# Patient Record
Sex: MALE | Race: WHITE | NOT HISPANIC OR LATINO | Employment: FULL TIME | URBAN - NONMETROPOLITAN AREA
[De-identification: names, ages, dates, MRNs, and addresses within clinical notes are randomized per-mention and may not be internally consistent; named-entity substitution may affect disease eponyms.]

---
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
## 2021-01-08 ENCOUNTER — OFFICE VISIT (OUTPATIENT)
Dept: FAMILY MEDICINE CLINIC | Facility: CLINIC | Age: 21
End: 2021-01-08

## 2021-01-08 VITALS
DIASTOLIC BLOOD PRESSURE: 84 MMHG | SYSTOLIC BLOOD PRESSURE: 144 MMHG | HEIGHT: 74 IN | BODY MASS INDEX: 40.43 KG/M2 | OXYGEN SATURATION: 98 % | TEMPERATURE: 97.5 F | HEART RATE: 91 BPM | WEIGHT: 315 LBS

## 2021-01-08 DIAGNOSIS — F41.1 GENERALIZED ANXIETY DISORDER: ICD-10-CM

## 2021-01-08 DIAGNOSIS — I10 ESSENTIAL HYPERTENSION: Primary | ICD-10-CM

## 2021-01-08 DIAGNOSIS — J30.89 NON-SEASONAL ALLERGIC RHINITIS DUE TO OTHER ALLERGIC TRIGGER: ICD-10-CM

## 2021-01-08 DIAGNOSIS — F33.1 MAJOR DEPRESSIVE DISORDER, RECURRENT EPISODE, MODERATE (HCC): ICD-10-CM

## 2021-01-08 DIAGNOSIS — K21.9 GASTROESOPHAGEAL REFLUX DISEASE: ICD-10-CM

## 2021-01-08 DIAGNOSIS — E66.01 MORBID OBESITY DUE TO EXCESS CALORIES (HCC): ICD-10-CM

## 2021-01-08 PROCEDURE — 99214 OFFICE O/P EST MOD 30 MIN: CPT | Performed by: NURSE PRACTITIONER

## 2021-01-08 RX ORDER — OMEPRAZOLE 40 MG/1
40 CAPSULE, DELAYED RELEASE ORAL DAILY
Qty: 30 CAPSULE | Refills: 5 | Status: SHIPPED | OUTPATIENT
Start: 2021-01-08 | End: 2021-07-14 | Stop reason: SDUPTHER

## 2021-01-08 RX ORDER — SERTRALINE HYDROCHLORIDE 25 MG/1
25 TABLET, FILM COATED ORAL DAILY
Qty: 30 TABLET | Refills: 5 | Status: SHIPPED | OUTPATIENT
Start: 2021-01-08 | End: 2021-07-14 | Stop reason: SDUPTHER

## 2021-01-08 RX ORDER — HYDROCHLOROTHIAZIDE 25 MG/1
25 TABLET ORAL DAILY
Qty: 30 TABLET | Refills: 5 | Status: SHIPPED | OUTPATIENT
Start: 2021-01-08 | End: 2021-07-14 | Stop reason: SDUPTHER

## 2021-01-08 RX ORDER — FEXOFENADINE HCL 180 MG/1
180 TABLET ORAL DAILY
Qty: 30 TABLET | Refills: 5 | Status: SHIPPED | OUTPATIENT
Start: 2021-01-08 | End: 2021-07-14 | Stop reason: SDUPTHER

## 2021-01-08 NOTE — PROGRESS NOTES
"Chief Complaint  Hypertension    Subjective          Fredis Zavaleta presents to [facility] for   Hypertension  This is a chronic problem. The current episode started more than 1 year ago. The problem is unchanged. The problem is uncontrolled. Associated symptoms include anxiety. Pertinent negatives include no chest pain, palpitations or shortness of breath. There are no associated agents to hypertension. Risk factors for coronary artery disease include sedentary lifestyle, obesity and male gender. Current antihypertension treatment includes diuretics. The current treatment provides moderate improvement. Compliance problems include exercise and diet (pt states he has not taken his medication today).    Anxiety  Presents for follow-up visit. Symptoms include depressed mood and nervous/anxious behavior. Patient reports no chest pain, palpitations or shortness of breath. Symptoms occur most days. The severity of symptoms is causing significant distress. The quality of sleep is good. Nighttime awakenings: occasional.     Compliance with medications is %. Treatment side effects: none.   Heartburn  He complains of heartburn. He reports no chest pain. This is a chronic problem. The current episode started more than 1 year ago. The problem occurs frequently. The problem has been waxing and waning. The heartburn does not wake him from sleep. The heartburn does not limit his activity. The heartburn doesn't change with position. The symptoms are aggravated by certain foods. Risk factors include obesity and lack of exercise. He has tried a PPI for the symptoms. The treatment provided significant relief.       Objective   Vital Signs:   /84   Pulse 91   Temp 97.5 °F (36.4 °C)   Ht 188 cm (74.02\")   Wt (!) 176 kg (387 lb)   SpO2 98%   BMI 49.67 kg/m²     Physical Exam  Vitals signs and nursing note reviewed.   Constitutional:       Appearance: He is well-developed. He is obese. "   Neck:      Musculoskeletal: Normal range of motion and neck supple.   Cardiovascular:      Rate and Rhythm: Normal rate and regular rhythm.      Heart sounds: Normal heart sounds.   Pulmonary:      Effort: Pulmonary effort is normal.      Breath sounds: Normal breath sounds.   Skin:     General: Skin is warm and dry.   Neurological:      Mental Status: He is alert and oriented to person, place, and time.   Psychiatric:         Behavior: Behavior normal.         Thought Content: Thought content normal.         Judgment: Judgment normal.        Result Review :                 Assessment and Plan    Problem List Items Addressed This Visit        Allergies and Adverse Reactions    Allergic rhinitis    Relevant Medications    fexofenadine (ALLEGRA) 180 MG tablet       Cardiac and Vasculature    Essential hypertension - Primary    Relevant Medications    hydroCHLOROthiazide (HYDRODIURIL) 25 MG tablet       Endocrine and Metabolic    Morbid obesity due to excess calories (CMS/HCC)      Other Visit Diagnoses     Generalized anxiety disorder        Relevant Medications    sertraline (ZOLOFT) 25 MG tablet    Major depressive disorder, recurrent episode, moderate (CMS/HCC)        Relevant Medications    sertraline (ZOLOFT) 25 MG tablet    Gastroesophageal reflux disease        Relevant Medications    omeprazole (PrilOSEC) 40 MG capsule          Follow Up   Return in about 6 months (around 7/8/2021).  Patient was given instructions and counseling regarding his condition or for health maintenance advice. Please see specific information pulled into the AVS if appropriate.

## 2021-07-14 DIAGNOSIS — J30.89 NON-SEASONAL ALLERGIC RHINITIS DUE TO OTHER ALLERGIC TRIGGER: ICD-10-CM

## 2021-07-14 DIAGNOSIS — F33.1 MAJOR DEPRESSIVE DISORDER, RECURRENT EPISODE, MODERATE (HCC): ICD-10-CM

## 2021-07-14 DIAGNOSIS — F41.1 GENERALIZED ANXIETY DISORDER: ICD-10-CM

## 2021-07-14 DIAGNOSIS — K21.9 GASTROESOPHAGEAL REFLUX DISEASE: ICD-10-CM

## 2021-07-14 DIAGNOSIS — I10 ESSENTIAL HYPERTENSION: ICD-10-CM

## 2021-07-14 NOTE — TELEPHONE ENCOUNTER
Caller: Fredis Zavaleta    Relationship: Self    Best call back number:[phone]    Medication needed:   Requested Prescriptions     Pending Prescriptions Disp Refills   • omeprazole (priLOSEC) 40 MG capsule 30 capsule 5     Sig: Take 1 capsule by mouth Daily.   • sertraline (ZOLOFT) 25 MG tablet 30 tablet 5     Sig: Take 1 tablet by mouth Daily.   • hydroCHLOROthiazide (HYDRODIURIL) 25 MG tablet 30 tablet 5     Sig: Take 1 tablet by mouth Daily.   • fexofenadine (ALLEGRA) 180 MG tablet 30 tablet 5     Sig: Take 1 tablet by mouth Daily.       When do you need the refill by: ASAP    What additional details did the patient provide when requesting the medication: REQUESTING REFILL UNTIL APPT ON 8/3/21    Does the patient have less than a 3 day supply:  [x] Yes  [] No    What is the patient's preferred pharmacy: [place], [place] - [address] [facility] [phone] [facility] [phone]

## 2021-07-16 DIAGNOSIS — K21.9 GASTROESOPHAGEAL REFLUX DISEASE: ICD-10-CM

## 2021-07-16 DIAGNOSIS — J30.89 NON-SEASONAL ALLERGIC RHINITIS DUE TO OTHER ALLERGIC TRIGGER: ICD-10-CM

## 2021-07-16 DIAGNOSIS — F41.1 GENERALIZED ANXIETY DISORDER: ICD-10-CM

## 2021-07-16 DIAGNOSIS — F33.1 MAJOR DEPRESSIVE DISORDER, RECURRENT EPISODE, MODERATE (HCC): ICD-10-CM

## 2021-07-16 DIAGNOSIS — I10 ESSENTIAL HYPERTENSION: ICD-10-CM

## 2021-07-18 RX ORDER — SERTRALINE HYDROCHLORIDE 25 MG/1
25 TABLET, FILM COATED ORAL DAILY
Qty: 30 TABLET | Refills: 5 | Status: SHIPPED | OUTPATIENT
Start: 2021-07-18 | End: 2021-08-03 | Stop reason: SDUPTHER

## 2021-07-18 RX ORDER — HYDROCHLOROTHIAZIDE 25 MG/1
25 TABLET ORAL DAILY
Qty: 30 TABLET | Refills: 5 | Status: SHIPPED | OUTPATIENT
Start: 2021-07-18 | End: 2021-08-03 | Stop reason: SDUPTHER

## 2021-07-18 RX ORDER — FEXOFENADINE HCL 180 MG/1
180 TABLET ORAL DAILY
Qty: 30 TABLET | Refills: 5 | Status: SHIPPED | OUTPATIENT
Start: 2021-07-18 | End: 2021-08-03 | Stop reason: SDUPTHER

## 2021-07-18 RX ORDER — OMEPRAZOLE 40 MG/1
40 CAPSULE, DELAYED RELEASE ORAL DAILY
Qty: 30 CAPSULE | Refills: 5 | Status: SHIPPED | OUTPATIENT
Start: 2021-07-18 | End: 2021-08-03 | Stop reason: SDUPTHER

## 2021-07-26 RX ORDER — SERTRALINE HYDROCHLORIDE 25 MG/1
25 TABLET, FILM COATED ORAL DAILY
Qty: 30 TABLET | Refills: 5 | OUTPATIENT
Start: 2021-07-26

## 2021-07-26 RX ORDER — HYDROCHLOROTHIAZIDE 25 MG/1
25 TABLET ORAL DAILY
Qty: 30 TABLET | Refills: 5 | OUTPATIENT
Start: 2021-07-26

## 2021-07-26 RX ORDER — OMEPRAZOLE 40 MG/1
40 CAPSULE, DELAYED RELEASE ORAL DAILY
Qty: 30 CAPSULE | Refills: 5 | OUTPATIENT
Start: 2021-07-26

## 2021-07-26 RX ORDER — FEXOFENADINE HCL 180 MG/1
180 TABLET ORAL DAILY
Qty: 30 TABLET | Refills: 5 | OUTPATIENT
Start: 2021-07-26

## 2021-08-03 ENCOUNTER — OFFICE VISIT (OUTPATIENT)
Dept: FAMILY MEDICINE CLINIC | Facility: CLINIC | Age: 21
End: 2021-08-03

## 2021-08-03 VITALS
DIASTOLIC BLOOD PRESSURE: 76 MMHG | BODY MASS INDEX: 39.17 KG/M2 | TEMPERATURE: 97.1 F | HEART RATE: 87 BPM | WEIGHT: 315 LBS | OXYGEN SATURATION: 98 % | SYSTOLIC BLOOD PRESSURE: 138 MMHG | HEIGHT: 75 IN

## 2021-08-03 DIAGNOSIS — K21.9 GASTROESOPHAGEAL REFLUX DISEASE: ICD-10-CM

## 2021-08-03 DIAGNOSIS — Z00.00 ANNUAL PHYSICAL EXAM: Primary | ICD-10-CM

## 2021-08-03 DIAGNOSIS — F41.1 GENERALIZED ANXIETY DISORDER: ICD-10-CM

## 2021-08-03 DIAGNOSIS — J30.89 NON-SEASONAL ALLERGIC RHINITIS DUE TO OTHER ALLERGIC TRIGGER: ICD-10-CM

## 2021-08-03 DIAGNOSIS — F33.1 MAJOR DEPRESSIVE DISORDER, RECURRENT EPISODE, MODERATE (HCC): ICD-10-CM

## 2021-08-03 DIAGNOSIS — I10 ESSENTIAL HYPERTENSION: ICD-10-CM

## 2021-08-03 PROCEDURE — 99395 PREV VISIT EST AGE 18-39: CPT | Performed by: FAMILY MEDICINE

## 2021-08-03 RX ORDER — HYDROCHLOROTHIAZIDE 25 MG/1
25 TABLET ORAL DAILY
Qty: 90 TABLET | Refills: 1 | Status: SHIPPED | OUTPATIENT
Start: 2021-08-03 | End: 2022-07-26 | Stop reason: SDUPTHER

## 2021-08-03 RX ORDER — FEXOFENADINE HCL 180 MG/1
180 TABLET ORAL DAILY
Qty: 90 TABLET | Refills: 1 | Status: SHIPPED | OUTPATIENT
Start: 2021-08-03 | End: 2022-07-26 | Stop reason: SDUPTHER

## 2021-08-03 RX ORDER — SERTRALINE HYDROCHLORIDE 25 MG/1
25 TABLET, FILM COATED ORAL DAILY
Qty: 90 TABLET | Refills: 1 | Status: SHIPPED | OUTPATIENT
Start: 2021-08-03 | End: 2022-07-26 | Stop reason: SDUPTHER

## 2021-08-03 RX ORDER — OMEPRAZOLE 40 MG/1
40 CAPSULE, DELAYED RELEASE ORAL DAILY
Qty: 30 CAPSULE | Refills: 5 | Status: SHIPPED | OUTPATIENT
Start: 2021-08-03 | End: 2022-07-26 | Stop reason: SDUPTHER

## 2021-08-03 NOTE — PROGRESS NOTES
Annual Exam      HEALTH RISK ASSESSMENT    2000    Recent Hospitalizations:  No hospitalization(s) within the last year..        Current Medical Providers:  Patient Care Team:  Robyn Burger APRN as PCP - General (Nurse Practitioner)        Smoking Status:  Social History     Tobacco Use   Smoking Status Never Smoker   Smokeless Tobacco Never Used       Alcohol Consumption:  Social History     Substance and Sexual Activity   Alcohol Use No       Depression Screen:   PHQ-2/PHQ-9 Depression Screening 8/3/2021   Little interest or pleasure in doing things 0   Feeling down, depressed, or hopeless 0   Trouble falling or staying asleep, or sleeping too much 0   Feeling tired or having little energy 0   Poor appetite or overeating 0   Feeling bad about yourself - or that you are a failure or have let yourself or your family down 0   Trouble concentrating on things, such as reading the newspaper or watching television 0   Moving or speaking so slowly that other people could have noticed. Or the opposite - being so fidgety or restless that you have been moving around a lot more than usual 0   Thoughts that you would be better off dead, or of hurting yourself in some way 0   Total Score 0   If you checked off any problems, how difficult have these problems made it for you to do your work, take care of things at home, or get along with other people? Not difficult at all       Health Habits and Functional and Cognitive Screening:  No flowsheet data found.        Does the patient have evidence of cognitive impairment? No    Aspirin use counseling: Does not need ASA (and currently is not on it)      Recent Lab Results:  CMP:  Lab Results   Component Value Date    GLU 93 05/01/2019    BUN 13 05/01/2019    CREATININE 0.79 05/01/2019    EGFRIFNONA CANCELED 05/01/2019    EGFRIFAFRI CANCELED 05/01/2019    BCR 16.5 05/01/2019     05/01/2019    K 4.0 05/01/2019    CO2 27.2 05/01/2019    CALCIUM 9.6 05/01/2019     PROTENTOTREF 7.3 05/01/2019    ALBUMIN 4.70 (H) 05/01/2019    LABGLOBREF 2.6 05/01/2019    LABIL2 1.8 05/01/2019    BILITOT 0.2 05/01/2019    ALKPHOS 70 05/01/2019    AST 18 05/01/2019    ALT 36 05/01/2019     Lipid Panel:  Lab Results   Component Value Date    TRIG 114 03/30/2018    HDL 44 (L) 03/30/2018    VLDL 22.8 03/30/2018     HbA1c:  Lab Results   Component Value Date    HGBA1C 4.7 10/14/2015       Visual Acuity:   Visual Acuity Screening    Right eye Left eye Both eyes   Without correction:      With correction: 20/30 20/200 20/30       Age-appropriate Screening Schedule:  Refer to the list below for future screening recommendations based on patient's age, sex and/or medical conditions. Orders for these recommended tests are listed in the plan section. The patient has been provided with a written plan.    Health Maintenance   Topic Date Due   • INFLUENZA VACCINE  10/01/2021   • TDAP/TD VACCINES (2 - Td or Tdap) 08/28/2023        Subjective   History of Present Illness    Fredis Zavaleta is a 20 y.o. male who presents for an annual physical exam.    The following portions of the patient's history were reviewed and updated as appropriate: allergies, current medications, past family history, past medical history, past social history, past surgical history and problem list.    Outpatient Medications Prior to Visit   Medication Sig Dispense Refill   • levalbuterol (XOPENEX) 0.63 MG/3ML nebulizer solution Take 1 ampule by nebulization Every 6 (Six) Hours As Needed for Wheezing. 360 mL 0   • fexofenadine (ALLEGRA) 180 MG tablet Take 1 tablet by mouth Daily. 30 tablet 5   • hydroCHLOROthiazide (HYDRODIURIL) 25 MG tablet Take 1 tablet by mouth Daily. 30 tablet 5   • omeprazole (priLOSEC) 40 MG capsule Take 1 capsule by mouth Daily. 30 capsule 5   • sertraline (ZOLOFT) 25 MG tablet Take 1 tablet by mouth Daily. 30 tablet 5     No facility-administered medications prior to visit.       Patient Active Problem List    Diagnosis   • Allergic rhinitis   • Airway hyperreactivity   • Cerumen impaction   • Essential hypertension   • Morbid obesity due to excess calories (CMS/[facility])   • Hypothyroid   • GERD (gastroesophageal reflux disease)   • Elevated heart rate with elevated blood pressure   • Eczema of face       Advance Care Planning:  ACP discussion was declined by the patient. Patient does not have an advance directive, declines further assistance.    Identification of Risk Factors:  Risk factors include: n/a.    Review of Systems   All other systems reviewed and are negative.      Compared to one year ago, the patient feels his physical health is better.  Compared to one year ago, the patient feels his mental health is better.    Objective     Physical Exam  Constitutional:       General: He is not in acute distress.     Appearance: Normal appearance. He is well-developed and well-groomed. He is obese. He is not ill-appearing, toxic-appearing or diaphoretic.   HENT:      Head: Normocephalic.      Right Ear: Tympanic membrane, ear canal and external ear normal.      Left Ear: Tympanic membrane, ear canal and external ear normal.      Nose: Nose normal. No congestion or rhinorrhea.      Mouth/Throat:      Mouth: Mucous membranes are moist.      Pharynx: Oropharynx is clear. No oropharyngeal exudate or posterior oropharyngeal erythema.   Eyes:      General: Lids are normal.         Right eye: No discharge.         Left eye: No discharge.      Extraocular Movements: Extraocular movements intact.      Pupils: Pupils are equal, round, and reactive to light.   Neck:      Vascular: No carotid bruit.   Cardiovascular:      Rate and Rhythm: Normal rate and regular rhythm.      Pulses: Normal pulses.      Heart sounds: Normal heart sounds. No murmur heard.   No friction rub. No gallop.    Pulmonary:      Effort: Pulmonary effort is normal. No respiratory distress.      Breath sounds: Normal breath sounds. No stridor. No wheezing, rhonchi  "or rales.   Chest:      Chest wall: No tenderness.   Abdominal:      General: Bowel sounds are normal. There is no distension.      Palpations: Abdomen is soft. There is no mass.      Tenderness: There is no abdominal tenderness. There is no right CVA tenderness, left CVA tenderness, guarding or rebound.      Hernia: No hernia is present.   Musculoskeletal:         General: No swelling or tenderness. Normal range of motion.      Cervical back: Normal range of motion and neck supple. No rigidity or tenderness.      Right lower leg: No edema.      Left lower leg: No edema.   Lymphadenopathy:      Cervical: No cervical adenopathy.   Skin:     General: Skin is warm.      Capillary Refill: Capillary refill takes less than 2 seconds.      Coloration: Skin is not jaundiced.      Findings: No bruising, erythema or rash.   Neurological:      General: No focal deficit present.      Mental Status: He is alert and oriented to person, place, and time.      Motor: Motor function is intact. No weakness.      Coordination: Coordination is intact.      Gait: Gait is intact. Gait normal.   Psychiatric:         Attention and Perception: Attention normal.         Mood and Affect: Mood normal.         Speech: Speech normal.         Behavior: Behavior normal.         Cognition and Memory: Cognition normal.         Judgment: Judgment normal.         Vitals:    08/03/21 1017   BP: 138/76   BP Location: Right arm   Patient Position: Sitting   Cuff Size: Large Adult   Pulse: 87   Temp: 97.1 °F (36.2 °C)   SpO2: 98%   Weight: (!) 166 kg (367 lb)   Height: 190.5 cm (75\")   PainSc: 0-No pain       Patient's Body mass index is 45.87 kg/m². indicating that he is morbidly obese (BMI > 40 or > 35 with obesity - related health condition). Obesity-related health conditions include the following: hypertension. Obesity is improving with lifestyle modifications. BMI is is above average; BMI management plan is completed. We discussed portion control and " increasing exercise..      Assessment/Plan   Patient Self-Management and Personalized Health Advice  The patient has been provided with information about: diet, exercise and weight management and preventive services including:   · Annual Wellness Visit (AWV).    Visit Diagnoses:    ICD-10-CM ICD-9-CM   1. Annual physical exam  Z00.00 V70.0   2. Essential hypertension  I10 401.9   3. Gastroesophageal reflux disease  K21.9 530.81   4. Generalized anxiety disorder  F41.1 300.02   5. Major depressive disorder, recurrent episode, moderate (CMS/HCC)  F33.1 296.32   6. Non-seasonal allergic rhinitis due to other allergic trigger  J30.89 477.8       No orders of the defined types were placed in this encounter.      Outpatient Encounter Medications as of 8/3/2021   Medication Sig Dispense Refill   • fexofenadine (ALLEGRA) 180 MG tablet Take 1 tablet by mouth Daily. 90 tablet 1   • hydroCHLOROthiazide (HYDRODIURIL) 25 MG tablet Take 1 tablet by mouth Daily. 90 tablet 1   • levalbuterol (XOPENEX) 0.63 MG/3ML nebulizer solution Take 1 ampule by nebulization Every 6 (Six) Hours As Needed for Wheezing. 360 mL 0   • omeprazole (priLOSEC) 40 MG capsule Take 1 capsule by mouth Daily. 30 capsule 5   • sertraline (ZOLOFT) 25 MG tablet Take 1 tablet by mouth Daily. 90 tablet 1   • [DISCONTINUED] fexofenadine (ALLEGRA) 180 MG tablet Take 1 tablet by mouth Daily. 30 tablet 5   • [DISCONTINUED] hydroCHLOROthiazide (HYDRODIURIL) 25 MG tablet Take 1 tablet by mouth Daily. 30 tablet 5   • [DISCONTINUED] omeprazole (priLOSEC) 40 MG capsule Take 1 capsule by mouth Daily. 30 capsule 5   • [DISCONTINUED] sertraline (ZOLOFT) 25 MG tablet Take 1 tablet by mouth Daily. 30 tablet 5     No facility-administered encounter medications on file as of 8/3/2021.       Reviewed use of high risk medication in the elderly: not applicable  Reviewed for potential of harmful drug interactions in the elderly: not applicable    Follow Up:  Return in about 6 months  (around 2/3/2022), or with lab work, for Recheck.     An After Visit Summary and PPPS with all of these plans were given to the patient.

## 2021-08-07 ENCOUNTER — HOSPITAL ENCOUNTER (EMERGENCY)
Facility: HOSPITAL | Age: 21
Discharge: HOME | End: 2021-08-07
Attending: FAMILY MEDICINE | Admitting: FAMILY MEDICINE

## 2021-08-07 ENCOUNTER — APPOINTMENT (OUTPATIENT)
Dept: GENERAL RADIOLOGY | Facility: HOSPITAL | Age: 21
End: 2021-08-07

## 2021-08-07 VITALS
OXYGEN SATURATION: 96 % | SYSTOLIC BLOOD PRESSURE: 150 MMHG | BODY MASS INDEX: 38.36 KG/M2 | WEIGHT: 315 LBS | HEART RATE: 76 BPM | TEMPERATURE: 98.7 F | HEIGHT: 76 IN | RESPIRATION RATE: 16 BRPM | DIASTOLIC BLOOD PRESSURE: 84 MMHG

## 2021-08-07 DIAGNOSIS — S69.92XA FINGER INJURY, LEFT, INITIAL ENCOUNTER: Primary | ICD-10-CM

## 2021-08-07 PROCEDURE — 73140 X-RAY EXAM OF FINGER(S): CPT | Performed by: RADIOLOGY

## 2021-08-07 PROCEDURE — 99282 EMERGENCY DEPT VISIT SF MDM: CPT

## 2021-08-07 PROCEDURE — 73140 X-RAY EXAM OF FINGER(S): CPT

## 2021-08-07 RX ORDER — NAPROXEN 500 MG/1
500 TABLET ORAL 2 TIMES DAILY PRN
Qty: 12 TABLET | Refills: 0 | Status: SHIPPED | OUTPATIENT
Start: 2021-08-07

## 2021-08-07 NOTE — ED PROVIDER NOTES
Subjective   Patient works at [employer] and got his left pinky caught between 2 baggies.  Patient is having increased pain to his pinky and numbness.      History provided by:  Patient   used: No    Finger Injury  Location:  Left pinky pain   Severity:  Mild  Onset quality:  Sudden  Duration:  1 hour  Timing:  Constant  Progression:  Worsening  Chronicity:  New  Relieved by:  Nothing   Worsened by:  Ambulation   Ineffective treatments:  None      Review of Systems   Constitutional: Negative.    HENT: Negative.    Eyes: Negative.    Respiratory: Negative.    Cardiovascular: Negative.    Gastrointestinal: Negative.    Endocrine: Negative.    Genitourinary: Negative.    Musculoskeletal: Positive for arthralgias and joint swelling.   Skin: Negative.    Allergic/Immunologic: Negative.    Neurological: Negative.    Hematological: Negative.    Psychiatric/Behavioral: Negative.        Past Medical History:   Diagnosis Date   • Anxiety    • Asthma    • Cerumen impaction    • Depression    • Elevated heart rate with elevated blood pressure    • GERD (gastroesophageal reflux disease)    • Hypertension    • Hypothyroid    • Multiple allergies    • Obesity        Allergies   Allergen Reactions   • Sulfa Antibiotics Rash       Past Surgical History:   Procedure Laterality Date   • EYE SURGERY     • TONSILLECTOMY     • TYMPANOSTOMY TUBE PLACEMENT         Family History   Problem Relation Age of Onset   • Diabetes Paternal Grandmother    • Heart disease Paternal Grandmother    • Hypertension Paternal Grandmother    • Diabetes Paternal Grandfather    • Heart disease Paternal Grandfather    • Hypertension Paternal Grandfather    • Cancer Mother    • Hemochromatosis Father    • No Known Problems Sister    • No Known Problems Maternal Grandmother    • No Known Problems Maternal Grandfather        Social History     Socioeconomic History   • Marital status: Single     Spouse name: Not on file   • Number of children:  Not on file   • Years of education: Not on file   • Highest education level: Not on file   Tobacco Use   • Smoking status: Never Smoker   • Smokeless tobacco: Never Used   Substance and Sexual Activity   • Alcohol use: No   • Drug use: No   • Sexual activity: Defer           Objective   Physical Exam  Vitals and nursing note reviewed.   Constitutional:       Appearance: He is well-developed.   HENT:      Head: Normocephalic.   Cardiovascular:      Rate and Rhythm: Normal rate and regular rhythm.   Pulmonary:      Effort: Pulmonary effort is normal.      Breath sounds: Normal breath sounds.   Abdominal:      General: Bowel sounds are normal.      Palpations: Abdomen is soft.      Tenderness: There is no abdominal tenderness.   Musculoskeletal:      Left hand: Swelling and tenderness present. Decreased range of motion.      Cervical back: Neck supple.   Skin:     General: Skin is warm and dry.   Neurological:      Mental Status: He is alert and oriented to person, place, and time.   Psychiatric:         Behavior: Behavior normal.         Thought Content: Thought content normal.         Judgment: Judgment normal.         Procedures           ED Course                                           MDM    Final diagnoses:   Finger injury, left, initial encounter       ED Disposition  ED Disposition     ED Disposition Condition Comment    Discharge Stable           Robyn Burger, APRN  [address] [place] [address]  [phone]    In 2 days           Medication List      New Prescriptions    naproxen 500 MG EC tablet  Commonly known as: EC NAPROSYN  Take 1 tablet by mouth 2 (Two) Times a Day As Needed for Mild Pain .           Where to Get Your Medications      These medications were sent to [facility] [place] - [address] - [phone]  - [phone]  [address], [place] [address]    Phone: [phone]   · naproxen 500 MG EC tablet          Kathi Rowley PA  08/07/21 3951

## 2022-07-26 ENCOUNTER — OFFICE VISIT (OUTPATIENT)
Dept: FAMILY MEDICINE CLINIC | Facility: CLINIC | Age: 22
End: 2022-07-26

## 2022-07-26 VITALS
WEIGHT: 315 LBS | OXYGEN SATURATION: 100 % | DIASTOLIC BLOOD PRESSURE: 80 MMHG | HEIGHT: 76 IN | TEMPERATURE: 96.4 F | BODY MASS INDEX: 38.36 KG/M2 | HEART RATE: 76 BPM | SYSTOLIC BLOOD PRESSURE: 128 MMHG

## 2022-07-26 DIAGNOSIS — K21.9 GASTROESOPHAGEAL REFLUX DISEASE: ICD-10-CM

## 2022-07-26 DIAGNOSIS — J30.89 NON-SEASONAL ALLERGIC RHINITIS DUE TO OTHER ALLERGIC TRIGGER: ICD-10-CM

## 2022-07-26 DIAGNOSIS — F33.1 MAJOR DEPRESSIVE DISORDER, RECURRENT EPISODE, MODERATE: ICD-10-CM

## 2022-07-26 DIAGNOSIS — F41.1 GENERALIZED ANXIETY DISORDER: ICD-10-CM

## 2022-07-26 DIAGNOSIS — E66.01 MORBID OBESITY: ICD-10-CM

## 2022-07-26 DIAGNOSIS — I10 ESSENTIAL HYPERTENSION: Primary | ICD-10-CM

## 2022-07-26 PROCEDURE — 99214 OFFICE O/P EST MOD 30 MIN: CPT | Performed by: NURSE PRACTITIONER

## 2022-07-26 RX ORDER — OMEPRAZOLE 40 MG/1
40 CAPSULE, DELAYED RELEASE ORAL DAILY
Qty: 90 CAPSULE | Refills: 1 | Status: SHIPPED | OUTPATIENT
Start: 2022-07-26 | End: 2023-01-25 | Stop reason: SDUPTHER

## 2022-07-26 RX ORDER — SERTRALINE HYDROCHLORIDE 25 MG/1
25 TABLET, FILM COATED ORAL DAILY
Qty: 90 TABLET | Refills: 1 | Status: SHIPPED | OUTPATIENT
Start: 2022-07-26 | End: 2023-01-25 | Stop reason: SDUPTHER

## 2022-07-26 RX ORDER — HYDROCHLOROTHIAZIDE 25 MG/1
25 TABLET ORAL DAILY
Qty: 90 TABLET | Refills: 1 | Status: SHIPPED | OUTPATIENT
Start: 2022-07-26 | End: 2023-01-25 | Stop reason: SDUPTHER

## 2022-07-26 RX ORDER — FEXOFENADINE HCL 180 MG/1
180 TABLET ORAL DAILY
Qty: 90 TABLET | Refills: 1 | Status: SHIPPED | OUTPATIENT
Start: 2022-07-26 | End: 2023-01-25 | Stop reason: SDUPTHER

## 2022-07-26 NOTE — PROGRESS NOTES
Chief Complaint  Hypertension    Subjective          Fredsi Zavaleta is a 21 y.o. male who presents today to [facility] for follow up    HPI:   History of Present Illness    Presents to clinic for follow-up and medication refill.  Reports is tolerating medications well with no issues or side effects.  Reports he does monitor blood pressure at home and reports average readings of around 120/70.  No associated symptoms.  No other concerns or issues at this time.      The following portions of the patient's history were reviewed and updated as appropriate: allergies, current medications, past family history, past medical history, past social history, past surgical history and problem list.    Objective     Allergy:   Allergies   Allergen Reactions   • Sulfa Antibiotics Rash        Current Medications:   Current Outpatient Medications   Medication Sig Dispense Refill   • fexofenadine (ALLEGRA) 180 MG tablet Take 1 tablet by mouth Daily. 90 tablet 1   • hydroCHLOROthiazide (HYDRODIURIL) 25 MG tablet Take 1 tablet by mouth Daily. 90 tablet 1   • levalbuterol (XOPENEX) 0.63 MG/3ML nebulizer solution Take 1 ampule by nebulization Every 6 (Six) Hours As Needed for Wheezing. 360 mL 0   • naproxen (EC NAPROSYN) 500 MG EC tablet Take 1 tablet by mouth 2 (Two) Times a Day As Needed for Mild Pain . 12 tablet 0   • omeprazole (priLOSEC) 40 MG capsule Take 1 capsule by mouth Daily. 90 capsule 1   • sertraline (ZOLOFT) 25 MG tablet Take 1 tablet by mouth Daily. 90 tablet 1     No current facility-administered medications for this visit.       Past Medical History:  Past Medical History:   Diagnosis Date   • Anxiety    • Asthma    • Cerumen impaction    • Depression    • Elevated heart rate with elevated blood pressure    • GERD (gastroesophageal reflux disease)    • Hypertension    • Hypothyroid    • Multiple allergies    • Obesity        Past Surgical History:  Past Surgical History:   Procedure  "Laterality Date   • EYE SURGERY     • TONSILLECTOMY     • TYMPANOSTOMY TUBE PLACEMENT         Social History:  Social History     Socioeconomic History   • Marital status: Single   Tobacco Use   • Smoking status: Never Smoker   • Smokeless tobacco: Never Used   Substance and Sexual Activity   • Alcohol use: No   • Drug use: No   • Sexual activity: Defer       Family History:  Family History   Problem Relation Age of Onset   • Diabetes Paternal Grandmother    • Heart disease Paternal Grandmother    • Hypertension Paternal Grandmother    • Diabetes Paternal Grandfather    • Heart disease Paternal Grandfather    • Hypertension Paternal Grandfather    • Cancer Mother    • Hemochromatosis Father    • No Known Problems Sister    • No Known Problems Maternal Grandmother    • No Known Problems Maternal Grandfather        Review of Systems:  Review of Systems   Respiratory: Negative for shortness of breath.    Cardiovascular: Negative for chest pain, palpitations and leg swelling.   Gastrointestinal: Negative for abdominal pain, nausea and vomiting.   Psychiatric/Behavioral: Negative for self-injury and suicidal ideas.       Vital Signs:   /80 (BP Location: Left arm, Patient Position: Sitting, Cuff Size: Large Adult)   Pulse 76   Temp 96.4 °F (35.8 °C) (Temporal)   Ht 193 cm (76\")   Wt (!) 163 kg (358 lb 12.8 oz)   SpO2 100%   BMI 43.67 kg/m²  RR: 16    Physical Exam:  Physical Exam  Vitals and nursing note reviewed.   Constitutional:       General: He is not in acute distress.     Appearance: Normal appearance. He is not ill-appearing or toxic-appearing.   HENT:      Head: Normocephalic.   Eyes:      Pupils: Pupils are equal, round, and reactive to light.   Neck:      Vascular: No carotid bruit.   Cardiovascular:      Rate and Rhythm: Normal rate and regular rhythm.      Heart sounds: Normal heart sounds.   Pulmonary:      Effort: Pulmonary effort is normal. No respiratory distress.      Breath sounds: Normal " breath sounds.   Abdominal:      General: Bowel sounds are normal.      Palpations: Abdomen is soft.   Musculoskeletal:         General: No swelling.   Skin:     General: Skin is warm and dry.      Coloration: Skin is not pale.   Neurological:      General: No focal deficit present.      Mental Status: He is alert and oriented to person, place, and time.   Psychiatric:         Mood and Affect: Mood normal.         Behavior: Behavior normal.         Thought Content: Thought content normal.         Judgment: Judgment normal.                  Assessment and Plan   Diagnoses and all orders for this visit:    1. Essential hypertension (Primary)  -     CBC & Differential  -     Comprehensive Metabolic Panel  -     Hemoglobin A1c  -     Lipid Panel  -     TSH Rfx On Abnormal To Free T4  -     hydroCHLOROthiazide (HYDRODIURIL) 25 MG tablet; Take 1 tablet by mouth Daily.  Dispense: 90 tablet; Refill: 1  Medication refilled.  Continue plan of care as per PCP.  Continue to monitor blood pressure at home and follow-up for readings outside of established parameters.  Diet and lifestyle modifications to control condition.    2. Non-seasonal allergic rhinitis due to other allergic trigger  -     CBC & Differential  -     Comprehensive Metabolic Panel  -     Hemoglobin A1c  -     Lipid Panel  -     TSH Rfx On Abnormal To Free T4  -     fexofenadine (ALLEGRA) 180 MG tablet; Take 1 tablet by mouth Daily.  Dispense: 90 tablet; Refill: 1  Medication refilled    3. Gastroesophageal reflux disease  -     CBC & Differential  -     Comprehensive Metabolic Panel  -     Hemoglobin A1c  -     Lipid Panel  -     TSH Rfx On Abnormal To Free T4  -     omeprazole (priLOSEC) 40 MG capsule; Take 1 capsule by mouth Daily.  Dispense: 90 capsule; Refill: 1  Medication refilled. Diet and lifestyle modifications to control condition.    4. Generalized anxiety disorder  -     CBC & Differential  -     Comprehensive Metabolic Panel  -     Hemoglobin  A1c  -     Lipid Panel  -     TSH Rfx On Abnormal To Free T4  -     sertraline (ZOLOFT) 25 MG tablet; Take 1 tablet by mouth Daily.  Dispense: 90 tablet; Refill: 1  Medication refilled.  Continue plan of care as per PCP.    5. Major depressive disorder, recurrent episode, moderate (HCC)  -     CBC & Differential  -     Comprehensive Metabolic Panel  -     Hemoglobin A1c  -     Lipid Panel  -     TSH Rfx On Abnormal To Free T4  -     sertraline (ZOLOFT) 25 MG tablet; Take 1 tablet by mouth Daily.  Dispense: 90 tablet; Refill: 1  Medication refilled.  Continue plan of care as per PCP.    6. Morbid obesity (HCC)  Diet and lifestyle modifications to promote weight loss      Discussed possible differential diagnoses, testing, treatment, recommended non-pharmacological interventions, risks, warning signs to monitor for that would indicate need for follow-up in clinic or ER. If no improvement with these regimens or you have new or worsening symptoms follow-up. Patient verbalizes understanding and agreement with plan of care. Denies further needs or concerns.     Patient was given instructions and counseling regarding her condition and for health maintenance advised.    Class 3 Severe Obesity (BMI >=40). Obesity-related health conditions include the following: obstructive sleep apnea, hypertension, coronary heart disease, diabetes mellitus, dyslipidemias, GERD and peripheral vascular disease. Obesity is worsening. BMI is is above average; BMI management plan is completed. We discussed portion control and increasing exercise.       I spent 30 minutes caring for patient on this date of service. This time includes time spent by me in the following activities: preparing for the visit, reviewing tests, obtaining and/or reviewing a separately obtained history, performing a medically appropriate examination and/or evaluation, counseling and educating the patient/family/caregiver, ordering medications, tests, or procedures and  documenting information in the medical record    Meds ordered during this visit:  New Medications Ordered This Visit   Medications   • fexofenadine (ALLEGRA) 180 MG tablet     Sig: Take 1 tablet by mouth Daily.     Dispense:  90 tablet     Refill:  1   • hydroCHLOROthiazide (HYDRODIURIL) 25 MG tablet     Sig: Take 1 tablet by mouth Daily.     Dispense:  90 tablet     Refill:  1   • omeprazole (priLOSEC) 40 MG capsule     Sig: Take 1 capsule by mouth Daily.     Dispense:  90 capsule     Refill:  1   • sertraline (ZOLOFT) 25 MG tablet     Sig: Take 1 tablet by mouth Daily.     Dispense:  90 tablet     Refill:  1       Patient Instructions:  Patient instructions given for the following visit diagnosis:    ICD-10-CM ICD-9-CM   1. Essential hypertension  I10 401.9   2. Non-seasonal allergic rhinitis due to other allergic trigger  J30.89 477.8   3. Gastroesophageal reflux disease  K21.9 530.81   4. Generalized anxiety disorder  F41.1 300.02   5. Major depressive disorder, recurrent episode, moderate (HCC)  F33.1 296.32   6. Morbid obesity (HCC)  E66.01 278.01       Follow Up   Return in about 6 months (around 1/26/2023) for Sooner if needed.        This document has been electronically signed by LAURIE Pina  July 26, 2022 09:17 EDT    Patient was given instructions and counseling regarding his condition or for health maintenance advice. Please see specific information pulled into the AVS if appropriate.     Part of this note may be an electronic transcription/translation of spoken language to printed text using the Dragon Dictation System.

## 2022-07-27 LAB
ALBUMIN SERPL-MCNC: 4.2 G/DL (ref 3.5–5.2)
ALBUMIN/GLOB SERPL: 1.9 G/DL
ALP SERPL-CCNC: 82 U/L (ref 39–117)
ALT SERPL-CCNC: 21 U/L (ref 1–41)
AST SERPL-CCNC: 21 U/L (ref 1–40)
BASOPHILS # BLD AUTO: 0.12 10*3/MM3 (ref 0–0.2)
BASOPHILS NFR BLD AUTO: 1.4 % (ref 0–1.5)
BILIRUB SERPL-MCNC: 0.2 MG/DL (ref 0–1.2)
BUN SERPL-MCNC: 9 MG/DL (ref 6–20)
BUN/CREAT SERPL: 12.5 (ref 7–25)
CALCIUM SERPL-MCNC: 8.9 MG/DL (ref 8.6–10.5)
CHLORIDE SERPL-SCNC: 108 MMOL/L (ref 98–107)
CHOLEST SERPL-MCNC: 162 MG/DL (ref 0–200)
CO2 SERPL-SCNC: 25 MMOL/L (ref 22–29)
CREAT SERPL-MCNC: 0.72 MG/DL (ref 0.76–1.27)
EGFRCR SERPLBLD CKD-EPI 2021: 134.1 ML/MIN/1.73
EOSINOPHIL # BLD AUTO: 1.03 10*3/MM3 (ref 0–0.4)
EOSINOPHIL NFR BLD AUTO: 11.8 % (ref 0.3–6.2)
ERYTHROCYTE [DISTWIDTH] IN BLOOD BY AUTOMATED COUNT: 12.9 % (ref 12.3–15.4)
GLOBULIN SER CALC-MCNC: 2.2 GM/DL
GLUCOSE SERPL-MCNC: 76 MG/DL (ref 65–99)
HBA1C MFR BLD: 4.8 % (ref 4.8–5.6)
HCT VFR BLD AUTO: 41.1 % (ref 37.5–51)
HDLC SERPL-MCNC: 40 MG/DL (ref 40–60)
HGB BLD-MCNC: 13.9 G/DL (ref 13–17.7)
IMM GRANULOCYTES # BLD AUTO: 0.02 10*3/MM3 (ref 0–0.05)
IMM GRANULOCYTES NFR BLD AUTO: 0.2 % (ref 0–0.5)
IMP & REVIEW OF LAB RESULTS: NORMAL
LDLC SERPL CALC-MCNC: 100 MG/DL (ref 0–100)
LYMPHOCYTES # BLD AUTO: 2.43 10*3/MM3 (ref 0.7–3.1)
LYMPHOCYTES NFR BLD AUTO: 27.8 % (ref 19.6–45.3)
MCH RBC QN AUTO: 28.7 PG (ref 26.6–33)
MCHC RBC AUTO-ENTMCNC: 33.8 G/DL (ref 31.5–35.7)
MCV RBC AUTO: 84.9 FL (ref 79–97)
MONOCYTES # BLD AUTO: 0.68 10*3/MM3 (ref 0.1–0.9)
MONOCYTES NFR BLD AUTO: 7.8 % (ref 5–12)
NEUTROPHILS # BLD AUTO: 4.45 10*3/MM3 (ref 1.7–7)
NEUTROPHILS NFR BLD AUTO: 51 % (ref 42.7–76)
NRBC BLD AUTO-RTO: 0 /100 WBC (ref 0–0.2)
PLATELET # BLD AUTO: 349 10*3/MM3 (ref 140–450)
POTASSIUM SERPL-SCNC: 4.1 MMOL/L (ref 3.5–5.2)
PROT SERPL-MCNC: 6.4 G/DL (ref 6–8.5)
RBC # BLD AUTO: 4.84 10*6/MM3 (ref 4.14–5.8)
SODIUM SERPL-SCNC: 143 MMOL/L (ref 136–145)
TRIGL SERPL-MCNC: 120 MG/DL (ref 0–150)
TSH SERPL DL<=0.005 MIU/L-ACNC: 4.37 UIU/ML (ref 0.27–4.2)
VLDLC SERPL CALC-MCNC: 22 MG/DL (ref 5–40)
WBC # BLD AUTO: 8.73 10*3/MM3 (ref 3.4–10.8)

## 2022-07-28 DIAGNOSIS — I10 ESSENTIAL HYPERTENSION: Primary | ICD-10-CM

## 2022-07-29 ENCOUNTER — TELEPHONE (OUTPATIENT)
Dept: FAMILY MEDICINE CLINIC | Facility: CLINIC | Age: 22
End: 2022-07-29

## 2022-07-29 LAB
T4 FREE SERPL-MCNC: 1.11 NG/DL (ref 0.93–1.7)
WRITTEN AUTHORIZATION: NORMAL

## 2022-07-29 NOTE — TELEPHONE ENCOUNTER
----- Message from LAURIE Pina sent at 7/29/2022 10:29 AM EDT -----  Please call patient regarding results.     Labs okay.  Recommend to follow-up to have thyroid level rechecked in 4 to 6 weeks.    Left message for patient to return call..    Hub to read message

## 2023-01-25 ENCOUNTER — OFFICE VISIT (OUTPATIENT)
Dept: FAMILY MEDICINE CLINIC | Facility: CLINIC | Age: 23
End: 2023-01-25
Payer: MEDICAID

## 2023-01-25 VITALS
HEART RATE: 96 BPM | OXYGEN SATURATION: 99 % | BODY MASS INDEX: 38.36 KG/M2 | WEIGHT: 315 LBS | SYSTOLIC BLOOD PRESSURE: 142 MMHG | HEIGHT: 76 IN | TEMPERATURE: 97.8 F | DIASTOLIC BLOOD PRESSURE: 88 MMHG

## 2023-01-25 DIAGNOSIS — F33.1 MAJOR DEPRESSIVE DISORDER, RECURRENT EPISODE, MODERATE: ICD-10-CM

## 2023-01-25 DIAGNOSIS — R21 RASH: Primary | ICD-10-CM

## 2023-01-25 DIAGNOSIS — F41.1 GENERALIZED ANXIETY DISORDER: ICD-10-CM

## 2023-01-25 DIAGNOSIS — K21.9 GASTROESOPHAGEAL REFLUX DISEASE: ICD-10-CM

## 2023-01-25 DIAGNOSIS — I10 ESSENTIAL HYPERTENSION: ICD-10-CM

## 2023-01-25 DIAGNOSIS — J30.89 NON-SEASONAL ALLERGIC RHINITIS DUE TO OTHER ALLERGIC TRIGGER: ICD-10-CM

## 2023-01-25 PROCEDURE — 99214 OFFICE O/P EST MOD 30 MIN: CPT | Performed by: FAMILY MEDICINE

## 2023-01-25 RX ORDER — OMEPRAZOLE 40 MG/1
40 CAPSULE, DELAYED RELEASE ORAL DAILY
Qty: 90 CAPSULE | Refills: 1 | Status: SHIPPED | OUTPATIENT
Start: 2023-01-25

## 2023-01-25 RX ORDER — FEXOFENADINE HCL 180 MG/1
180 TABLET ORAL DAILY
Qty: 90 TABLET | Refills: 1 | Status: SHIPPED | OUTPATIENT
Start: 2023-01-25

## 2023-01-25 RX ORDER — SERTRALINE HYDROCHLORIDE 25 MG/1
25 TABLET, FILM COATED ORAL DAILY
Qty: 90 TABLET | Refills: 1 | Status: SHIPPED | OUTPATIENT
Start: 2023-01-25

## 2023-01-25 RX ORDER — HYDROCHLOROTHIAZIDE 25 MG/1
25 TABLET ORAL DAILY
Qty: 90 TABLET | Refills: 1 | Status: SHIPPED | OUTPATIENT
Start: 2023-01-25

## 2023-01-25 NOTE — PROGRESS NOTES
"Chief Complaint  Hypertension    Subjective          Fredis Zavaleta presents to [facility]  Hypertension  This is a chronic problem. The current episode started more than 1 year ago. The problem is controlled. Associated symptoms include anxiety. Current antihypertension treatment includes diuretics. The current treatment provides significant improvement.   Heartburn  This is a chronic problem. The current episode started more than 1 year ago. He has tried a PPI for the symptoms. The treatment provided significant relief.   Anxiety  Presents for follow-up visit. Symptoms include nervous/anxious behavior. Symptoms occur occasionally (improved with medication). The severity of symptoms is moderate.     Compliance with medications is %.       Review of Systems   Psychiatric/Behavioral: The patient is nervous/anxious.          Objective   Vital Signs:   /88 (BP Location: Right arm, Patient Position: Sitting, Cuff Size: Large Adult)   Pulse 96   Temp 97.8 °F (36.6 °C) (Temporal)   Ht 193 cm (76\")   Wt (!) 161 kg (356 lb)   SpO2 99%   BMI 43.33 kg/m²     Physical Exam  Constitutional:       General: He is not in acute distress.     Appearance: Normal appearance. He is well-developed and well-groomed. He is not ill-appearing, toxic-appearing or diaphoretic.   HENT:      Head: Normocephalic.      Nose: Nose normal. No congestion or rhinorrhea.      Mouth/Throat:      Mouth: Mucous membranes are moist.      Pharynx: Oropharynx is clear. No oropharyngeal exudate or posterior oropharyngeal erythema.   Eyes:      General: Lids are normal.         Right eye: No discharge.         Left eye: No discharge.      Extraocular Movements: Extraocular movements intact.      Pupils: Pupils are equal, round, and reactive to light.   Neck:      Vascular: No carotid bruit.   Cardiovascular:      Rate and Rhythm: Normal rate and regular rhythm.      Pulses: Normal pulses.      Heart sounds: " Normal heart sounds. No murmur heard.    No friction rub. No gallop.   Pulmonary:      Effort: Pulmonary effort is normal. No respiratory distress.      Breath sounds: Normal breath sounds. No stridor. No wheezing, rhonchi or rales.   Chest:      Chest wall: No tenderness.   Abdominal:      General: Bowel sounds are normal. There is no distension.      Palpations: Abdomen is soft. There is no mass.      Tenderness: There is no abdominal tenderness. There is no right CVA tenderness, left CVA tenderness, guarding or rebound.      Hernia: No hernia is present.   Musculoskeletal:         General: No swelling or tenderness. Normal range of motion.      Cervical back: Normal range of motion and neck supple. No rigidity or tenderness.      Right lower leg: No edema.      Left lower leg: No edema.   Lymphadenopathy:      Cervical: No cervical adenopathy.   Skin:     General: Skin is warm.      Capillary Refill: Capillary refill takes less than 2 seconds.      Coloration: Skin is not jaundiced.      Findings: Rash present. No bruising or erythema.   Neurological:      General: No focal deficit present.      Mental Status: He is alert and oriented to person, place, and time.      Motor: Motor function is intact. No weakness.      Coordination: Coordination is intact.      Gait: Gait is intact. Gait normal.   Psychiatric:         Attention and Perception: Attention normal.         Mood and Affect: Mood normal.         Speech: Speech normal.         Behavior: Behavior normal.         Cognition and Memory: Cognition normal.         Judgment: Judgment normal.        Result Review :                 Assessment and Plan    Diagnoses and all orders for this visit:    1. Rash (Primary)  -     triamcinolone (KENALOG) 0.1 % ointment; Apply 1 application topically to the appropriate area as directed 2 (Two) Times a Day.  Dispense: 80 g; Refill: 1    2. Essential hypertension  -     hydroCHLOROthiazide (HYDRODIURIL) 25 MG tablet; Take 1  tablet by mouth Daily.  Dispense: 90 tablet; Refill: 1  -     CBC Auto Differential; Future  -     Cancel: Comprehensive Metabolic Panel; Future  -     Cancel: TSH; Future  -     Cancel: T4, Free; Future  -     T4, Free  -     TSH  -     Comprehensive Metabolic Panel  -     CBC & Differential    3. Gastroesophageal reflux disease  -     omeprazole (priLOSEC) 40 MG capsule; Take 1 capsule by mouth Daily.  Dispense: 90 capsule; Refill: 1    4. Generalized anxiety disorder  -     sertraline (ZOLOFT) 25 MG tablet; Take 1 tablet by mouth Daily.  Dispense: 90 tablet; Refill: 1    5. Major depressive disorder, recurrent episode, moderate (HCC)  -     sertraline (ZOLOFT) 25 MG tablet; Take 1 tablet by mouth Daily.  Dispense: 90 tablet; Refill: 1    6. Non-seasonal allergic rhinitis due to other allergic trigger  -     fexofenadine (ALLEGRA) 180 MG tablet; Take 1 tablet by mouth Daily.  Dispense: 90 tablet; Refill: 1      Patient's Body mass index is 43.33 kg/m². indicating that he is morbidly obese (BMI > 40 or > 35 with obesity - related health condition). Obesity-related health conditions include the following: hypertension and GERD. Obesity is unchanged. BMI is is above average; BMI management plan is completed. We discussed low calorie, low carb based diet program, portion control and increasing exercise..    Follow Up   Return in about 3 months (around 4/25/2023), or labs today.  Patient was given instructions and counseling regarding his condition or for health maintenance advice. Please see specific information pulled into the AVS if appropriate.     This document has been electronically signed by LAURIE Gonzales  January 25, 2023 20:38 EST

## 2023-02-15 ENCOUNTER — TELEPHONE (OUTPATIENT)
Dept: FAMILY MEDICINE CLINIC | Facility: CLINIC | Age: 23
End: 2023-02-15
Payer: MEDICAID

## 2023-06-26 ENCOUNTER — TELEPHONE (OUTPATIENT)
Dept: FAMILY MEDICINE CLINIC | Facility: CLINIC | Age: 23
End: 2023-06-26

## 2023-06-26 NOTE — TELEPHONE ENCOUNTER
Caller: Fredis Zavaleta    Relationship to patient: Self    Best call back number: [phone]     Chief complaint: RASH, FEET DRY, SCALY SORES ON ANKLES    Type of visit: ACITE    Requested date: NEXT WEEK    Additional notes:  THERE ARE NO APPOINTMENTS LISTED FOR NEXT WEEK FOR ANYONE.